# Patient Record
Sex: MALE | Race: WHITE
[De-identification: names, ages, dates, MRNs, and addresses within clinical notes are randomized per-mention and may not be internally consistent; named-entity substitution may affect disease eponyms.]

---

## 2018-05-07 ENCOUNTER — HOSPITAL ENCOUNTER (EMERGENCY)
Dept: HOSPITAL 45 - C.EDB | Age: 28
Discharge: HOME | End: 2018-05-07
Payer: COMMERCIAL

## 2018-05-07 VITALS — TEMPERATURE: 97.88 F | OXYGEN SATURATION: 98 %

## 2018-05-07 VITALS
HEIGHT: 72.01 IN | HEIGHT: 72.01 IN | WEIGHT: 266.54 LBS | WEIGHT: 266.54 LBS | BODY MASS INDEX: 36.1 KG/M2 | BODY MASS INDEX: 36.1 KG/M2

## 2018-05-07 VITALS — SYSTOLIC BLOOD PRESSURE: 116 MMHG | DIASTOLIC BLOOD PRESSURE: 76 MMHG

## 2018-05-07 VITALS — HEART RATE: 74 BPM | OXYGEN SATURATION: 95 %

## 2018-05-07 DIAGNOSIS — F17.210: ICD-10-CM

## 2018-05-07 DIAGNOSIS — L30.9: ICD-10-CM

## 2018-05-07 DIAGNOSIS — R07.2: Primary | ICD-10-CM

## 2018-05-07 LAB
ALBUMIN SERPL-MCNC: 3.9 GM/DL (ref 3.4–5)
ALP SERPL-CCNC: 64 U/L (ref 45–117)
ALT SERPL-CCNC: 39 U/L (ref 12–78)
AST SERPL-CCNC: 21 U/L (ref 15–37)
BASOPHILS # BLD: 0.07 K/UL (ref 0–0.2)
BASOPHILS NFR BLD: 1 %
BUN SERPL-MCNC: 15 MG/DL (ref 7–18)
CALCIUM SERPL-MCNC: 8.6 MG/DL (ref 8.5–10.1)
CO2 SERPL-SCNC: 25 MMOL/L (ref 21–32)
CREAT SERPL-MCNC: 0.71 MG/DL (ref 0.6–1.4)
EOS ABS #: 0.26 K/UL (ref 0–0.5)
EOSINOPHIL NFR BLD AUTO: 222 K/UL (ref 130–400)
GLUCOSE SERPL-MCNC: 90 MG/DL (ref 70–99)
HCT VFR BLD CALC: 45.8 % (ref 42–52)
HGB BLD-MCNC: 16.2 G/DL (ref 14–18)
IG#: 0.03 K/UL (ref 0–0.02)
IMM GRANULOCYTES NFR BLD AUTO: 32.2 %
LIPASE: 91 U/L (ref 73–393)
LYMPHOCYTES # BLD: 2.2 K/UL (ref 1.2–3.4)
MCH RBC QN AUTO: 30.5 PG (ref 25–34)
MCHC RBC AUTO-ENTMCNC: 35.4 G/DL (ref 32–36)
MCV RBC AUTO: 86.3 FL (ref 80–100)
MONO ABS #: 0.52 K/UL (ref 0.11–0.59)
MONOCYTES NFR BLD: 7.6 %
NEUT ABS #: 3.75 K/UL (ref 1.4–6.5)
NEUTROPHILS # BLD AUTO: 3.8 %
NEUTROPHILS NFR BLD AUTO: 55 %
PMV BLD AUTO: 9.3 FL (ref 7.4–10.4)
POTASSIUM SERPL-SCNC: 4 MMOL/L (ref 3.5–5.1)
PROT SERPL-MCNC: 7.2 GM/DL (ref 6.4–8.2)
RED CELL DISTRIBUTION WIDTH CV: 13.1 % (ref 11.5–14.5)
RED CELL DISTRIBUTION WIDTH SD: 41 FL (ref 36.4–46.3)
SODIUM SERPL-SCNC: 139 MMOL/L (ref 136–145)
WBC # BLD AUTO: 6.83 K/UL (ref 4.8–10.8)

## 2018-05-07 NOTE — EMERGENCY ROOM VISIT NOTE
History


First contact with patient:  01:16


Chief Complaint:  CHEST PAIN


Stated Complaint:  CHEST PAIN,ALL OVER WEIRD FEELING


Nursing Triage Summary:  


Patient c/o left sided chest tightness for two days that is intermittent also 


having sob on and off.





History of Present Illness


The patient is a 28 year old male who presents to the Emergency Room with 

complaints of intermittent chest pain for the past few days that lasts for 

about 10 minutes to left-sided chest.  Nothing makes it better or worse.  It 

does not radiate.  Patient states he feels anxious and has been stressing over 

this.  Because of this, he feels he might be short of breath.  Patient denies 

exertional chest pain, cough, congestion, abdominal pain, leg pain, leg swelling

, fever, chills, back pain, recent travel, drug use.  He does smoke.  No family 

history of heart disease or blood clots.  His mother  from breast cancer.  

Patient also complains of an itchy rash to his right lower leg for the past 

several weeks.  He has tried steroid cream with no improvement of symptoms.





Review of Systems


An 10 system review of systems was completed with positives and pertinent 

negatives listed in the HPI.





Past Medical/Surgical History


None





Social History


Smoking Status:  Current Every Day Smoker


Alcohol Use:  occasionally


Drug Use:  none


Marital Status:  single


Housing Status:  lives with roommate


Occupation Status:  employed





Current/Historical Medications


Scheduled PRN


Acetaminophen (Tylenol), 500-1,000 MG PO AS DIRECTED PRN for Pain or Fever


Ibuprofen (Advil), 200-600 MG PO Q4H PRN for Pain or Fever





Physical Exam


Vital Signs











  Date Time  Temp Pulse Resp B/P (MAP) Pulse Ox O2 Delivery O2 Flow Rate FiO2


 


18 03:45  74 20  95 Room Air  


 


18 03:15  71   96 Room Air  


 


18 02:45  70 18  92 Room Air  


 


18 02:15  75 22  92 Room Air  


 


18 01:45  79 22  94 Room Air  


 


18 01:40  79 22  96 Room Air  


 


18 01:21    116/76    


 


18 01:17     99 Room Air  


 


18 01:13  73      


 


18 01:11    134/83    


 


18 01:10 36.6 67 20 134/83 99 Room Air  


 


18 01:10     98 Room Air  











Physical Exam


VITALS: Vitals are noted on the nurse's note and reviewed by myself.  Vital 

signs stable.


GENERAL: White male with tobacco odor, in no acute distress, nondiaphoretic, 

well-developed well-nourished.


SKIN: Right lower leg with raised erythematous dermatitis 5 cm x 3 cm to the 

right lateral lower leg.  The rest of the skin was without rashes, erythema, 

edema, or bruising.  There is no tenting of the skin.  Capillary reflex less 

than 2 seconds.


HEAD: Normocephalic atraumatic.  


EARS: External auditory canals clear, tympanic membranes pearly gray without 

erythema or effusion bilaterally.


EYES: Pupils equal round and reactive to light and accommodation.  Conjunctivae 

without injection, sclerae without icterus.  Extraocular movements intact.  


NOSE: Patent, turbinates without inflammation or discharge.   


MOUTH: Mucous membranes moist.   Pharynx without erythema or exudate.  Uvula 

midline.  Airway patent.  Tongue does not deviate.  


NECK: Supple without nuchal rigidity.  No lymphadenopathy.  No thyromegaly.  

Cervical spine is nontender.  No JVD.


HEART: Regular rate and rhythm without murmurs gallops or rubs.


LUNGS: Clear to auscultation bilaterally without wheezes, rales or rhonchi.  No 

retractions or accessory muscle use.


ABDOMEN: Positive bowel sounds x 4.  Normal tympanic percussion.  Soft, 

nontender, without masses or organomegaly.  Jerome sign negative.  No guarding 

or rebound tenderness. No CVA tenderness


MUSCULOSKELETAL: No muscle atrophy, erythema, or edema noted.  


NEURO: Patient was alert and oriented to person place and time.  Normal 

sensation to light and sharp touch.     No focal neurological deficits.





Medical Decision & Procedures


Laboratory Results


18 01:23








Red Blood Count 5.31, Mean Corpuscular Volume 86.3, Mean Corpuscular Hemoglobin 

30.5, Mean Corpuscular Hemoglobin Concent 35.4, Mean Platelet Volume 9.3, 

Neutrophils (%) (Auto) 55.0, Lymphocytes (%) (Auto) 32.2, Monocytes (%) (Auto) 

7.6, Eosinophils (%) (Auto) 3.8, Basophils (%) (Auto) 1.0, Neutrophils # (Auto) 

3.75, Lymphocytes # (Auto) 2.20, Monocytes # (Auto) 0.52, Eosinophils # (Auto) 

0.26, Basophils # (Auto) 0.07





18 01:23

















Test


  18


01:23 18


03:55


 


White Blood Count


  6.83 K/uL


(4.8-10.8) 


 


 


Red Blood Count


  5.31 M/uL


(4.7-6.1) 


 


 


Hemoglobin


  16.2 g/dL


(14.0-18.0) 


 


 


Hematocrit 45.8 % (42-52)  


 


Mean Corpuscular Volume


  86.3 fL


() 


 


 


Mean Corpuscular Hemoglobin


  30.5 pg


(25-34) 


 


 


Mean Corpuscular Hemoglobin


Concent 35.4 g/dl


(32-36) 


 


 


Platelet Count


  222 K/uL


(130-400) 


 


 


Mean Platelet Volume


  9.3 fL


(7.4-10.4) 


 


 


Neutrophils (%) (Auto) 55.0 %  


 


Lymphocytes (%) (Auto) 32.2 %  


 


Monocytes (%) (Auto) 7.6 %  


 


Eosinophils (%) (Auto) 3.8 %  


 


Basophils (%) (Auto) 1.0 %  


 


Neutrophils # (Auto)


  3.75 K/uL


(1.4-6.5) 


 


 


Lymphocytes # (Auto)


  2.20 K/uL


(1.2-3.4) 


 


 


Monocytes # (Auto)


  0.52 K/uL


(0.11-0.59) 


 


 


Eosinophils # (Auto)


  0.26 K/uL


(0-0.5) 


 


 


Basophils # (Auto)


  0.07 K/uL


(0-0.2) 


 


 


RDW Standard Deviation


  41.0 fL


(36.4-46.3) 


 


 


RDW Coefficient of Variation


  13.1 %


(11.5-14.5) 


 


 


Immature Granulocyte % (Auto) 0.4 %  


 


Immature Granulocyte # (Auto)


  0.03 K/uL


(0.00-0.02) 


 


 


D-Dimer


  < 190 ug/L FEU


(0-500) 


 


 


Anion Gap


  6.0 mmol/L


(3-11) 


 


 


Est Creatinine Clear Calc


Drug Dose 204.3 ml/min 


  


 


 


Estimated GFR (


American) 145.9 


  


 


 


Estimated GFR (Non-


American 125.9 


  


 


 


BUN/Creatinine Ratio 21.5 (10-20)  


 


Calcium Level


  8.6 mg/dl


(8.5-10.1) 


 


 


Total Bilirubin


  0.5 mg/dl


(0.2-1) 


 


 


Direct Bilirubin


  0.2 mg/dl


(0-0.2) 


 


 


Aspartate Amino Transf


(AST/SGOT) 21 U/L (15-37) 


  


 


 


Alanine Aminotransferase


(ALT/SGPT) 39 U/L (12-78) 


  


 


 


Alkaline Phosphatase


  64 U/L


() 


 


 


Troponin I


  < 0.015 ng/ml


(0-0.045) 


 


 


Total Protein


  7.2 gm/dl


(6.4-8.2) 


 


 


Albumin


  3.9 gm/dl


(3.4-5.0) 


 


 


Lipase


  91 U/L


() 


 


 


Chemistry Specimen Hemolysis   


 


Bedside Troponin I


  


  < 0.030 ng/ml


(0-0.045)











Medications Administered











 Medications


  (Trade)  Dose


 Ordered  Sig/Carrie


 Route  Start Time


 Stop Time Status Last Admin


Dose Admin


 


 Lorazepam


  (Ativan Inj)  1 mg  NOW  STAT


 IV  18 01:22


 18 01:23 DC 18 01:30


1 MG


 


 Clotrimazole


  (Lotrimin 1% Crm)  1 appln  NOW  ONCE


 EXT  18 02:30


 18 02:31 DC 18 02:30


1 APPLN











ED Course


Prior records/ancillary studies reviewed.


Triage Nursing notes reviewed.





The patient's history was concerning for chest pain. 





Differential diagnosis:


Etiologies such as cardiac ischemia, aortic dissection, pulmonary embolism, 

pneumonia, pneumothorax, musculoskeletal, infections, pericarditis, myocarditis

, esophageal rupture, gastrointestinal, as well as others were entertained. 





Physical examination:


As above.





ER treatment provided:


Ativan, clotrimazole cream to the rash


On reassessment the patient felt better.





Diagnostic interpretation by me:


The electrocardiogram was negative for pathologic change.  Normal sinus, normal 

intervals, no acute ST-T wave changes.  Impression normal sinus rhythm 

interpreted by myself





The labs revealed negative d-dimer.  Negative troponin 2





Imaging studies:


Chest x-ray with no acute consolidation, pneumothorax free of my interpretation





HEART SCORE: 


Hx: high/mod/low suspicion: 0


ECG: ST depression/nonspecific changes/normal: 0


Age: Greater than 65/45-64/less than 45: 0


Risk factors: (Hypertension, hyperlipidemia, diabetes, coronary disease, 

tobacco use, cocaine use): 1


Troponin: Greater than 2 times normal limits/1-2 times normal limits/normal: 0


Total: 1





Wells Score





Symptoms of DVT 3pt: 0


Alternative diagnoses better explains illness 3pts: 0


Tachycardia greater than 100 1.5 pts 0


Immobilization greater than 3 days or surgery in the previous 4 weeks 1.5 pts: 0


Prior history of DVT or PE 1.5 pts: 0


Presence of hemoptysis 1pt: 0


Presence of malignancy 1pt: 0


(Score greater than 6 is high probability, score 2-6 moderate probability, 

score less than 2 low probability)





Total: 0








Exam and history seem consistent with chest pain most likely related to 

anxiety.  Rash could be fungal and patient was given clotrimazole cream.  

Patient had unremarkable workup as above.  Normal EKG.  Negative troponin x 2.  

Negative d-dimer.  Patient was advised to rest, stay well-hydrated follow-up 

family can a few days here in the ER sooner for chest pain, difficulty breathing

, worsening signs or symptoms or as needed.  I do not believe this is cardiac 

in etiology.  Patient's heart score was low.  He had a normal EKG and 2 

negative troponins.  D-dimer was negative and wells score was low.


By the evaluation outlined above emergent etiologies such as cardiac ischemia, 

aortic dissection, pulmonary embolism, pneumonia, pneumothorax, infections, 

pericarditis, myocarditis, gastrointestinal, as well as others were deemed 

relatively unlikely. 





The pt informed about the findings as listed above. All questions were answered 

and  pleased with the treatment. Return instructions were outlined and the 

patient was discharged in stable condition. 








Referral:


The patient was referred back to  primary care physician for follow-up in 2 to 

3 days for a recheck of the current condition.








Case reviewed with my attending





The chart was completed utilizing Dragon Speech voice recognition software.   

Grammatical errors, random word insertions, pronoun errors, and incomplete 

sentences are an occassional consequence of this system due to software 

limitations, ambient noise, and hardware issues.  Any formal questions or 

concerns about the content, text, or information contained within the body of 

this dictation should be directly addressed to the physician assistant for 

clarification.





Medical Decision


As above





Medication Reconcilliation


Current Medication List:  was personally reviewed by me





Blood Pressure Screening


Patient's blood pressure:  Normal blood pressure





Impression





 Primary Impression:  


 Substernal precordial chest pain


 Additional Impression:  


 Dermatitis





Departure Information


Dispostion


Home / Self-Care





Condition


GOOD





Referrals


No Doctor, Assigned (PCP)





Patient Instructions


My [facility]





Additional Instructions





Clotrimazole cream: Apply to the affected area twice a day.Any medication can 

cause an allergic reaction, stop the pills immediately and return to the ER for 

rash, hives, breathing difficulties, or swelling.





Ibuprofen(Motrin, Advil) may be used for fever or pain.  Use 600mg every six 

hours as needed.  Take with food.  Avoid using more than 2400mg in a 24 hour 

period.  Do not use 2400mg per day for more than three consecutive days without 

physician direction.  Prolonged inappropriate use can lead to stomach upset or 

ulcers. 


(AND/OR)


Acetaminophen(Tylenol) may be used for fever or pain.  Use 1000mg every six 

hours as needed.  Avoid using more than 3000mg in a 24 hour period.  





Rest and drink plenty of fluids as tolerated.





Continue current medications.





Avoid strenuous activities and anything that worsens your pain.  Resume normal 

activities once your symptoms resolve.    





Return to the ER immediately for worsening or persistent chest pain, abdominal 

pain, vomiting, fevers, chest pains, difficulty breathing, worsening of your 

condition, or as needed.





Follow up with your primary physician in 2-3 days for a recheck of your current 

condition.





Problem Qualifiers

## 2018-05-07 NOTE — DIAGNOSTIC IMAGING REPORT
CHEST ONE VIEW PORTABLE



CLINICAL HISTORY: 28 years-old Male presenting with CHEST PAIN. 



TECHNIQUE: Portable upright AP view of the chest was obtained.



COMPARISON: None.



FINDINGS:

Cardiac silhouette enlarged. No focal opacity. No large effusion or

pneumothorax. Osseous structures normal. Upper abdomen normal.



IMPRESSION:

1.  Apparent cardiac enlargement though this may be due to AP technique. This

could be confirmed with PA view. No other evidence of acute cardiopulmonary

disease.







Electronically signed by:  Silver Chávez M.D.

5/7/2018 7:43 AM



Dictated Date/Time:  5/7/2018 7:42 AM